# Patient Record
Sex: MALE | Race: WHITE | NOT HISPANIC OR LATINO | Employment: STUDENT | URBAN - METROPOLITAN AREA
[De-identification: names, ages, dates, MRNs, and addresses within clinical notes are randomized per-mention and may not be internally consistent; named-entity substitution may affect disease eponyms.]

---
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
## 2023-12-19 ENCOUNTER — TELEMEDICINE (OUTPATIENT)
Dept: DERMATOLOGY | Facility: CLINIC | Age: 23
End: 2023-12-19

## 2023-12-19 DIAGNOSIS — L70.0 ACNE VULGARIS: Primary | ICD-10-CM

## 2023-12-19 NOTE — PROGRESS NOTES
Virtual Regular Visit    Verification of patient location:    Patient is located at Home in the following state in which I hold an active license [place]      Assessment/Plan:    Problem List Items Addressed This Visit    None  Visit Diagnoses       Acne vulgaris    -  Primary                 Reason for visit is   Chief Complaint   Patient presents with    Virtual Regular Visit          Encounter provider Jose Johnson MD    Provider located at DERMATOLOGY [address] [address] [phone]      Recent Visits  No visits were found meeting these conditions.  Showing recent visits within past 7 days and meeting all other requirements  Today's Visits  Date Type Provider Dept   12/19/23 Telemedicine Jose Johnson MD [facility]   Showing today's visits and meeting all other requirements  Future Appointments  No visits were found meeting these conditions.  Showing future appointments within next 150 days and meeting all other requirements       The patient was identified by name and date of birth. Jose Elias Montenegro was informed that this is a telemedicine visit and that the visit is being conducted through the Epic Embedded platform. He agrees to proceed..  My office door was closed. No one else was in the room.  He acknowledged consent and understanding of privacy and security of the video platform. The patient has agreed to participate and understands they can discontinue the visit at any time.    Patient is aware this is a billable service.     Subjective  Jose Elias Montenegro is a 23 y.o. male for Acne .      HPI     History reviewed. No pertinent past medical history.    History reviewed. No pertinent surgical history.    No current outpatient medications on file.     No current facility-administered medications for this visit.        No Known Allergies    Review of Systems    Video Exam    There were no vitals filed for this  "visit.    Physical Exam     Visit Time  Total Visit Duration: 15 mins    [facility] Note     Patient Name: Jose Elias Montenegro  Encounter Date: 12/19/2023     Have you been cared for by a [facility] Dermatologist in the last 3 years and, if so, which description applies to you?    NO.   I am considered a \"new\" patient and must complete all patient intake questions. I am MALE/not capable of bearing children.    REVIEW OF SYSTEMS:  Have you recently had or currently have any of the following? Recent fever or chills? No  Any non-healing wound? No   PAST MEDICAL HISTORY:  Have you personally ever had or currently have any of the following?  If \"YES,\" then please provide more detail. Skin cancer (such as Melanoma, Basal Cell Carcinoma, Squamous Cell Carcinoma?  No  Tuberculosis, HIV/AIDS, Hepatitis B or C: No  Radiation Treatment No   HISTORY OF IMMUNOSUPPRESSION:   Do you have a history of any of the following:  Systemic Immunosuppression such as Diabetes, Biologic or Immunotherapy, Chemotherapy, Organ Transplantation, Bone Marrow Transplantation?  No     Answering \"YES\" requires the addition of the dotphrase \"IMMUNOSUPPRESSED\" as the first diagnosis of the patient's visit.   FAMILY HISTORY:  Any \"first degree relatives\" (parent, brother, sister, or child) with the following?    Skin Cancer, Pancreatic or Other Cancer? No   PATIENT EXPERIENCE:    Do you want the Dermatologist to perform a COMPLETE skin exam today including a clinical examination under the \"bra and underwear\" areas?  NO  If necessary, do we have your permission to call and leave a detailed message on your Preferred Phone number that includes your specific medical information?  Yes      No Known Allergies No current outpatient medications on file.          Whom besides the patient is providing clinical information about today's encounter?   NO ADDITIONAL HISTORIAN (patient alone provided history)    Physical Exam and Assessment/Plan by " "Diagnosis:    ACNE VULGARIS (\"COMMON ACNE\")    Physical Exam:  Anatomic Location Affected:  Cheeks  Morphological Description: Scattered pink papules and open/closed comedones  Pertinent Positives:  Pertinent Negatives:    Additional History of Present Condition:  Using CeraVe cleanser and salicylic acid. Tried Adapalene and Dapsone.    Discussed that treatment is directed at improving skin appearance and reducing the likelihood of scarring. Discussed theraputic ladder including topical OTC treatments, topical prescriptions, and oral medications. Discussed side effects as noted below.     Plan today:     AM:  - Start benzaclin gel  - Start non-comedogenic moisturizer such as CeraVe, Cetaphil or Vanicream.   - SPF 30 or greater (can be a lotion with SPF like CeraVe AM)       PM:  - Gentle cleanser, such as CeraVe, Cetaphil or La Roche Posay  - Start Adapalene 0.3% gel, qHS. Educated that this medication can be drying and irritating. Start by applying a pea-sized amount of product 2 nights per week, then increase to nightly as tolerated. Written instructions provided.  - Start non-comedogenic moisturizer such as CeraVe, Cetaphil or Vanicream. May use on top of retinoid. If retinoid is too drying, may employ the \"sandwich method.\" To do this, apply layer of non-comedogenic moisturizer, followed by layer of retinoid, followed by another layer of non-comedogenic moisturizer.   - Start Doxycycline 100 mg BID. Educated on side effects, including GI upset, sun sensitivity, esophagitis. Discussed taking pill at least 2 hours before bedtime, taking pills with food and water and avoiding sun.     Call with any questions or concerns before next follow-up visit; do not stop medications abruptly without consulting provider.    COMMON POSSIBLE SIDE EFFECTS OF MEDICATIONS    Retinoids - dryness, redness, increased sun sensitivity.  Benzoyl peroxide - drying, redness, bleaching of clothes, towels and sheets, allergy.  Doxycycline - " headaches; dizziness; irritation of the throat; nail changes; discoloration of teeth.  Sun sensitivity - even if you have dark skin, this medicine can make you burn more easily.  Make sure you protect yourself from the sun, either by avoiding being outside between 11 AM and 3 PM, wearing and reapplying sunscreen/sunblock, or wearing sun protective clothing  Nausea/vomiting - if you experience nausea with this medication, take it with food.    WHEN AND WHERE TO CALL WITH CONCERNS  We are here to help!  If you experience any unusual symptoms, then stop taking or using the medication and call our office at [phone]).  It is better to be safe than to be sorry!      Scribe Attestation      I,:  Anabelle Soto MA am acting as a scribe while in the presence of the attending physician.:       I,:  Jose Johnson MD personally performed the services described in this documentation    as scribed in my presence.:

## 2023-12-20 ENCOUNTER — TELEPHONE (OUTPATIENT)
Age: 23
End: 2023-12-20

## 2023-12-20 RX ORDER — CLINDAMYCIN AND BENZOYL PEROXIDE 10; 50 MG/G; MG/G
GEL TOPICAL
Qty: 50 G | Refills: 3 | Status: SHIPPED | OUTPATIENT
Start: 2023-12-20

## 2023-12-20 RX ORDER — ADAPALENE GEL USP, 0.3% 3 MG/G
GEL TOPICAL
Qty: 45 G | Refills: 3 | Status: SHIPPED | OUTPATIENT
Start: 2023-12-20

## 2023-12-20 RX ORDER — DOXYCYCLINE 100 MG/1
CAPSULE ORAL
Qty: 180 CAPSULE | Refills: 0 | Status: SHIPPED | OUTPATIENT
Start: 2023-12-20 | End: 2024-03-19

## 2023-12-20 NOTE — TELEPHONE ENCOUNTER
Mom called to check the status of three new prescriptions, advise they were sent into [facility] this morning at 9:39am

## 2023-12-21 ENCOUNTER — TELEPHONE (OUTPATIENT)
Age: 23
End: 2023-12-21

## 2023-12-21 NOTE — TELEPHONE ENCOUNTER
Called pt to let him know the doxy was backordered at pharmacy but they are getting a delivery of some more today, and they will call him to let him know when its ready to

## 2023-12-21 NOTE — TELEPHONE ENCOUNTER
Pt calling requesting an appt in 4 months (April 2024) with Dr Johnson    He was seen virtually yesterday, told to call to schedule his FU. 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

## 2023-12-26 NOTE — TELEPHONE ENCOUNTER
Hi Dr. Johnson! If this pt were to be scheduled another virtual visit than we can get him in for April, but if not than it would be August for an in person visit. Please let me know what you would prefer best for this pt and I will give him a call to schedule. Thank you!

## 2023-12-27 NOTE — TELEPHONE ENCOUNTER
Attempted to call pt and offer 5/2 with dr. Johnson In her ambassador week for acne fu, pts mailbox is not set up, unable to lvm.

## 2023-12-27 NOTE — TELEPHONE ENCOUNTER
I'm forwarding this into the [place] clerical in basket as a reminder to give him a cb at a later time that he may be available. I would send a [employer] message, but he does not have on set up nor a second number to contact.